# Patient Record
(demographics unavailable — no encounter records)

---

## 2024-10-24 NOTE — DISCUSSION/SUMMARY
[FreeTextEntry1] : 52 y/o female with h/o hl, htn who presents for f/up today and second Leqvio injection.  HDL: LDL goal of at least <100mg/dL  - zetia started in 2/23; PCSK9i not covered  - LDL improved from 202 in May 2023 (only on Zetia) to 108 w/ one month repeat lipids in August 2023 after starting Leqvio; then 104 in 4/24  - Leqvio injected in L upper arm w/out incident today  - recheck lipids at her f/u w/ Dr. Bailey 12/4  - c/w Zetia 10mg qd; advised she could move the dosage timing of her Zetia to improve compliance; get a pill box, etc  - Encouraged patient to continue healthy exercise and eating habits, focusing on a Mediterranean style of eating w/ more plant based foods in general, and aiming for the recommended 150 minutes per week of moderate physical activity.  HTN: BP improved on recheck, pt hadn't taken her amlodipine yet - Encouraged the patient to monitor blood pressure at home, keep a log, and report results back to us for evaluation. Based on results, we will adjust the regimen as necessary. Advised patient to be mindful of sodium and alcohol intake, as well as any over-the-counter medications (such as NSAIDs or decongestants) that may increase blood pressure. - c/w amlodipine 2.5mg qd for now   Continue to follow w/ Dr. Vargas; make appt for 6 months for next Leqvio injection.

## 2024-10-24 NOTE — HISTORY OF PRESENT ILLNESS
[FreeTextEntry1] : 54 y/o female with h/o hl, htn who presents for f/up today and next Leqvio injection.  Last saw pt in April for her Leqvio injection. This will be her 4th shot. She denies any ADEs.  Feeling well overall; hasn't take her amlodipine yet today. Checks her BP at wor5k (reports current home monitor is not accurate) and has been 120s/80s. Also reports a lot of stress caring for her mom.  Patient denies any chest pain, SOB, palpitations, orthopnea, PND or LE edema.  She still reports missing Zetia doses occasionally.   ================================================= got leqvio injection ; tolerated well and presented for second injection 10/23.   She has been compliant w/ Zetia mostly, but admits to occasionally forgetting.   Patient denies any chest pain, SOB, palpitations, orthopnea, PND or LE edema.  zetia started in ; PCSK9i not covered  LDL improved from 202 in May to 108 w/ one month repeat lipids in August   Injection site assessed: yes; L upper arm LOT: [id number] EXP: 2025 NDC: 1059-9878-90   ========================================================================  -Echo : ef 60-65%, trace to mild ai, trace mr/tr  no cp, sob, syncope, lh, palpitations, edema, orthopnea, pnd    seen by pulm for deedee eval - sleep apnea eval pending  PFT 22: entirely normal / CT pending -- CT chest [facility] 2022 personally reviewed : unremarkable - there's just a small scar in the LLL  seen by cards in   -Exercise stress echo: - EF 60%, normal wall motion, PA pressures, and ECG post 11 METS.  ef 55-60%, no wma, trace ai/mr/tr  -Cardiac CTA :  - coronary artery Ca+ score 0, normal coronary arteries, dLAD short shallow myocardial bridge, left lower lobe linear opacity of questionable significance - f/up 3 months recommended.   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

## 2024-10-24 NOTE — PHYSICAL EXAM
[Well Developed] : well developed [Well Nourished] : well nourished [No Acute Distress] : no acute distress [Normal Gait] : normal gait [No Edema] : no edema [Normal] : alert and oriented, normal memory

## 2024-10-24 NOTE — HISTORY OF PRESENT ILLNESS
[FreeTextEntry1] : 52 y/o female with h/o hl, htn who presents for f/up today and next Leqvio injection.  Last saw pt in April for her Leqvio injection. This will be her 4th shot. She denies any ADEs.  Feeling well overall; hasn't take her amlodipine yet today. Checks her BP at wor5k (reports current home monitor is not accurate) and has been 120s/80s. Also reports a lot of stress caring for her mom.  Patient denies any chest pain, SOB, palpitations, orthopnea, PND or LE edema.  She still reports missing Zetia doses occasionally.   ================================================= got leqvio injection ; tolerated well and presented for second injection 10/23.   She has been compliant w/ Zetia mostly, but admits to occasionally forgetting.   Patient denies any chest pain, SOB, palpitations, orthopnea, PND or LE edema.  zetia started in ; PCSK9i not covered  LDL improved from 202 in May to 108 w/ one month repeat lipids in August   Injection site assessed: yes; L upper arm LOT: [id number] EXP: 2025 NDC: 4534-4494-49   ========================================================================  -Echo : ef 60-65%, trace to mild ai, trace mr/tr  no cp, sob, syncope, lh, palpitations, edema, orthopnea, pnd    seen by pulm for deedee eval - sleep apnea eval pending  PFT 22: entirely normal / CT pending -- CT chest [facility] 2022 personally reviewed : unremarkable - there's just a small scar in the LLL  seen by cards in   -Exercise stress echo: - EF 60%, normal wall motion, PA pressures, and ECG post 11 METS.  ef 55-60%, no wma, trace ai/mr/tr  -Cardiac CTA :  - coronary artery Ca+ score 0, normal coronary arteries, dLAD short shallow myocardial bridge, left lower lobe linear opacity of questionable significance - f/up 3 months recommended.   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

## 2024-10-24 NOTE — DISCUSSION/SUMMARY
[FreeTextEntry1] : 54 y/o female with h/o hl, htn who presents for f/up today and second Leqvio injection.  HDL: LDL goal of at least <100mg/dL  - zetia started in 2/23; PCSK9i not covered  - LDL improved from 202 in May 2023 (only on Zetia) to 108 w/ one month repeat lipids in August 2023 after starting Leqvio; then 104 in 4/24  - Leqvio injected in L upper arm w/out incident today  - recheck lipids at her f/u w/ Dr. Bailey 12/4  - c/w Zetia 10mg qd; advised she could move the dosage timing of her Zetia to improve compliance; get a pill box, etc  - Encouraged patient to continue healthy exercise and eating habits, focusing on a Mediterranean style of eating w/ more plant based foods in general, and aiming for the recommended 150 minutes per week of moderate physical activity.  HTN: BP improved on recheck, pt hadn't taken her amlodipine yet - Encouraged the patient to monitor blood pressure at home, keep a log, and report results back to us for evaluation. Based on results, we will adjust the regimen as necessary. Advised patient to be mindful of sodium and alcohol intake, as well as any over-the-counter medications (such as NSAIDs or decongestants) that may increase blood pressure. - c/w amlodipine 2.5mg qd for now   Continue to follow w/ Dr. Vargas; make appt for 6 months for next Leqvio injection.

## 2024-12-03 NOTE — DISCUSSION/SUMMARY
[Patient] : the patient [EKG obtained to assist in diagnosis and management of assessed problem(s)] : EKG obtained to assist in diagnosis and management of assessed problem(s) [___ Month(s)] : in [unfilled] month(s) [FreeTextEntry1] : 53 y/o female with h/o hl, htn, predm who presents for f/up today  -nuclear stress test ordered for cp  -echo ordered for cp  -ekg ordered today - nsr, normal intervals, no st/t changes  -labs 2024 reviewed, LpA to be ordered  -Exercise stress echo: 2021- EF 60%, normal wall motion, PA pressures, and ECG post 11 METS.  ef 55-60%, no wma, trace ai/mr/tr  -Cardiac CTA : 2021 - coronary artery Ca+ score 0, normal coronary arteries, dLAD short shallow myocardial bridge, left lower lobe linear opacity of questionable significance - f/up 3 months recommended  -close monitoring of blood sugar given predm  -continue norvasc 2.5 mg qd  -continue zetia, leqvio - last 10/24  -GI recs - elevated lft and chest discomfort  -f/up pulm recs, normal chest ct, pft normal  -f/up w Dr. Keyes for deedee evaluation  -counseled on cvd risk factors  -Echo 12/22: ef 60-65%, trace to mild ai, trace mr/tr  -can consider microvascular dysfunction - and use of BB, statin  -f/up 4-6 months for hl, htn  I have spent 30 minutes reviewing labs, records, tests and discussed cvd risk factors, hl.

## 2024-12-03 NOTE — HISTORY OF PRESENT ILLNESS
[FreeTextEntry1] : 55 y/o female with h/o hl, htn who presents for f/up today.   last seen   notes some new cp over past few months  cp w exertion notes sob w exertion no lh, syncope, palpitations, orthopnea, pnd notes some le edema   got leqvio injection 10/24   -Echo : ef 60-65%, trace to mild ai, trace mr/tr  seen by pulm for deedee eval - sleep apnea eval pending  PFT 22: entirely normal / CT pending  CT chest [facility] 2022 personally reviewed : unremarkable - there's just a small scar in the LLL    -Exercise stress echo: - EF 60%, normal wall motion, PA pressures, and ECG post 11 METS.  ef 55-60%, no wma, trace ai/mr/tr   -Cardiac CTA :  - coronary artery Ca+ score 0, normal coronary arteries, dLAD short shallow myocardial bridge, left lower lobe linear opacity of questionable significance - f/up 3 months recommended.    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

## 2024-12-03 NOTE — HISTORY OF PRESENT ILLNESS
[FreeTextEntry1] : 53 y/o female with h/o hl, htn who presents for f/up today.   last seen   notes some new cp over past few months  cp w exertion notes sob w exertion no lh, syncope, palpitations, orthopnea, pnd notes some le edema   got leqvio injection 10/24   -Echo : ef 60-65%, trace to mild ai, trace mr/tr  seen by pulm for deedee eval - sleep apnea eval pending  PFT 22: entirely normal / CT pending  CT chest [facility] 2022 personally reviewed : unremarkable - there's just a small scar in the LLL    -Exercise stress echo: - EF 60%, normal wall motion, PA pressures, and ECG post 11 METS.  ef 55-60%, no wma, trace ai/mr/tr   -Cardiac CTA :  - coronary artery Ca+ score 0, normal coronary arteries, dLAD short shallow myocardial bridge, left lower lobe linear opacity of questionable significance - f/up 3 months recommended.    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

## 2024-12-03 NOTE — DISCUSSION/SUMMARY
[Patient] : the patient [EKG obtained to assist in diagnosis and management of assessed problem(s)] : EKG obtained to assist in diagnosis and management of assessed problem(s) [___ Month(s)] : in [unfilled] month(s) [FreeTextEntry1] : 51 y/o female with h/o hl, htn, predm who presents for f/up today  -nuclear stress test ordered for cp  -echo ordered for cp  -ekg ordered today - nsr, normal intervals, no st/t changes  -labs 2024 reviewed, LpA to be ordered  -Exercise stress echo: 2021- EF 60%, normal wall motion, PA pressures, and ECG post 11 METS.  ef 55-60%, no wma, trace ai/mr/tr  -Cardiac CTA : 2021 - coronary artery Ca+ score 0, normal coronary arteries, dLAD short shallow myocardial bridge, left lower lobe linear opacity of questionable significance - f/up 3 months recommended  -close monitoring of blood sugar given predm  -continue norvasc 2.5 mg qd  -continue zetia, leqvio - last 10/24  -GI recs - elevated lft and chest discomfort  -f/up pulm recs, normal chest ct, pft normal  -f/up w Dr. Keyes for deedee evaluation  -counseled on cvd risk factors  -Echo 12/22: ef 60-65%, trace to mild ai, trace mr/tr  -can consider microvascular dysfunction - and use of BB, statin  -f/up 4-6 months for hl, htn  I have spent 30 minutes reviewing labs, records, tests and discussed cvd risk factors, hl.

## 2024-12-04 NOTE — PHYSICAL EXAM
[General Appearance - In No Acute Distress] : in no acute distress [Abdomen Soft] : soft [Abdomen Tenderness] : non-tender [Abdomen Mass (___ Cm)] : no abdominal mass palpated

## 2024-12-09 NOTE — ASSESSMENT
[FreeTextEntry1] : 54F with PMH of HLD, thyroid nodule, HTN, fatty liver (S1-S2 steatosis with F0 scarring), and history of anemia, now presenting for routine follow-up and due for repeat fibroscan and colonoscopy.   Plan:  - f/u CMP, HbA1c, lipid panel, and iron studies from today  - f/u fibroscan from today  - plan for EGD/colonoscopy at [facility] depending on patient preference - discussed risk/benefits of endoscopy including infection, bleeding, perforation, or a missed lesion - bowel prep with 4L Golytely  - discussed CLD, NPO except medications after midnight the night before, and need for escort home from procedure

## 2024-12-09 NOTE — END OF VISIT
[] : Fellow [FreeTextEntry3] : Attending note   Patient with elevated liver enzymes and FibroScan suggestive of significant fatty infiltration of the liver but no Fibrosis.  I had a long conversation with patient and discussed the following  -Natural history of SLD (Steatotic Liver Disease, the new nomenclature for fatty liver disease), risk of liver fibrosis, cirrhosis, and HCC  -Extra hepatic risks associated with SLD including DM, atherosclerosis and malignances -Role of diet (especially Mediterranean diet), exercise, and weight loss (target of at least 10% of current body weight) -Role of Fibroscan and its limitations  -Importance of alcohol avoidance (she does not drink) -Literature about coffee  -She is not a candidate for Resmetirom at this point since the FDA indication is for patients with F2-3 Fibrosis  Positive hepatitis B core antibody  Patient's hepatitis panel showed positive hepatitis core antibody and negative hepatitis B surface antigen.  This is consistent with history of exposure to hepatitis B and a spontaneous clearance. Hepatitis B can be reactivated if patient becomes immune suppressed.  Immunosuppression can happen in the context of conditions such as hematologic malignancies.  Immunosuppression can also happen in the context of using immunosuppressive medications especially rituximab, chemotherapy used for bone marrow transplant, and wide range of immunosuppressive meds including long-term use of steroids.  Depends on the patient's risk of reactivation, preemptive prophylaxis with antihepatitis B medication can be used.  Indication for EGD and colonoscopy were discussed with patient.  [Time Spent: ___ minutes] : I have spent [unfilled] minutes of time on the encounter which excludes teaching and separately reported services.

## 2024-12-09 NOTE — ASSESSMENT
[FreeTextEntry1] : 54F with PMH of HLD, thyroid nodule, HTN, fatty liver (S1-S2 steatosis with F0 scarring), and history of anemia, now presenting for routine follow-up and due for repeat fibroscan and colonoscopy.   Plan:  - f/u CMP, HbA1c, lipid panel, and iron studies from today  - f/u fibroscan from today  - plan for EGD/colonoscopy at [facility] depending on patient preference - discussed risk/benefits of endoscopy including infection, bleeding, perforation, or a missed lesion - bowel prep with 4L Golytely  - discussed CLD, NPO except medications after midnight the night before, and need for escort home from procedure

## 2024-12-09 NOTE — HISTORY OF PRESENT ILLNESS
[FreeTextEntry1] : 54F with PMH of HLD, thyroid nodule, HTN, fatty liver (S1-S2 steatosis with F0 scarring), and history of anemia, now presenting for routine follow-up.   Interval History 12/04/24:  - Denies nausea, vomiting, diarrhea, constipation, melena or hematochezia, SOB, cough. Has been experiencing occasional chest pain, so is seeing cardiology for that. Also complains of occasional indigestion depending on which foods she eats (tries to avoid these foods but culturally it is difficult.)  - Weight stable at 140 lbs.   Currently follows with Dr. James (cardiology).  Receives Leqvio every 6 months to control her elevated HLD.  Her TG has decreased from >350 to 137 in April 2024. Positive hepatitis B core antibody.   Fibroscan (06/18/2023):  - Shows S1-S2 steatosis with F0 scarring. - Last fibroscan 6/18/2023 - kPA 5.8,  (S3, F0-1)  Prior anemia work-up:  CT scan in 2016 and months if you are showed a small 1.5 cm lesion in the ileum, repeat CT scan in 2018 did not show a mass Colonoscopy in 2018 showed appendiceal orifice 8 mm tubular adenoma, 1 diverticulum in the right colon, terminal ileum was intubated for about 20 cm with no major abnormality.  EGD August 2019 showed small hyperplastic polyps.   Most recent lab results 04/29/24:  - AST/ALT 64/109, alk phos 38, bili 0.3 (LFTs up-trending)  - iron sat 29%  - lipid panel , HDL 66, , cholesterol 194 (overall improved from prior)   Current meds: - Amlodipine 2.5 mg daily - Ezetimibe 10 mg daily - Leqvio as per cardiology  Allergies: NKDA  Family: Has 5(?) children - youngest 21.  passed away many years ago. Ovarian cancer in sister. No reported family hx of GI cancers otherwise.

## 2025-04-25 NOTE — DISCUSSION/SUMMARY
[FreeTextEntry1] : 54 y/o female with h/o hl, htn who presents for f/up today and next Leqvio injection.  HDL: LDL goal of at least <100mg/dL  - most recent LDL 98 in 12/2024 -  fifth dose of Leqvio 284mg/1.5mL administered inoffice today. Injection administered upper left arm. Patient tolerated the injection well without complications. Continue Leqvio injections every 6 months. GTIN [id number]  [id number] LOT [id number] Expiration date 11/30/2026. - Continue Zetia - Patient prefers to have labs completed fasting, provided lab slip - Encouraged patient to continue healthy exercise and eating habits, focusing on a Mediterranean style of eating w/ more plant based foods in general, and aiming for the recommended 150 minutes per week of moderate physical activity.  HTN: BP goal < 130/80, controlled on repeat - Encouraged the patient to monitor blood pressure at home, keep a log, and report results back to us for evaluation. Based on results, we will adjust the regimen as necessary. Advised patient to be mindful of sodium and alcohol intake, as well as any over-the-counter medications (such as NSAIDs or decongestants) that may increase blood pressure. - c/w amlodipine 2.5mg qd for now   Continue to follow w/ Dr. Vargas; make appt for 6 months for next Leqvio injection.

## 2025-04-25 NOTE — HISTORY OF PRESENT ILLNESS
[FreeTextEntry1] : 54 y/o female with h/o hl, htn who presents for f/up today and next Leqvio injection.  Last Leqvio injection on 10/24/2025. Last seen by Dr. Vargas on 12/3/2024. Patient presents today for 5th Leqvio injection. No acute cardiac concerns, feeling well overall. Her blood pressure at home/work is well controlled in the 120/80s. She has some limitations in her exercise due to left lower leg pain but tries to walk for exercise. She has been compliant with Amlodipine and Zetia.    Patient denies any chest pain or shortness of breath at rest, palpitations, orthopnea, PND, dizziness, falls, syncope, other neuro focal deficits, or lower extremity edema. ================================================================= Previous history: Last saw pt in April for her Leqvio injection. This will be her 4th shot. She denies any ADEs.  Feeling well overall; hasn't take her amlodipine yet today. Checks her BP at wor5k (reports current home monitor is not accurate) and has been 120s/80s. Also reports a lot of stress caring for her mom.  Patient denies any chest pain, SOB, palpitations, orthopnea, PND or LE edema.  She still reports missing Zetia doses occasionally.   ================================================= got leqvio injection ; tolerated well and presented for second injection 10/23.   She has been compliant w/ Zetia mostly, but admits to occasionally forgetting.   Patient denies any chest pain, SOB, palpitations, orthopnea, PND or LE edema.  zetia started in ; PCSK9i not covered  LDL improved from 202 in May to 108 w/ one month repeat lipids in August   Injection site assessed: yes; L upper arm LOT: [id number] EXP: 2025 NDC: 9672-0840-76   ========================================================================  -Echo : ef 60-65%, trace to mild ai, trace mr/tr  no cp, sob, syncope, lh, palpitations, edema, orthopnea, pnd    seen by pulm for deedee eval - sleep apnea eval pending  PFT 22: entirely normal / CT pending -- CT chest [facility] 2022 personally reviewed : unremarkable - there's just a small scar in the LLL  seen by cards in   -Exercise stress echo: - EF 60%, normal wall motion, PA pressures, and ECG post 11 METS.  ef 55-60%, no wma, trace ai/mr/tr  -Cardiac CTA :  - coronary artery Ca+ score 0, normal coronary arteries, dLAD short shallow myocardial bridge, left lower lobe linear opacity of questionable significance - f/up 3 months recommended.   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